# Patient Record
(demographics unavailable — no encounter records)

---

## 2024-12-03 NOTE — ADDENDUM
[FreeTextEntry1] : This note was written by Crystal Urias on 12/03/2024 acting as a scribe for AMANDA QUINTANA III, MD

## 2024-12-03 NOTE — PHYSICAL EXAM
[de-identified] : Right Hip:  Range of Motion in Degrees: 	                                             Claimant:	          Normal:	 Flexion (Active) 	                       120 	             120-degrees	 Flexion (Passive)	                       120	             120-degrees	 Extension (Active)	                        -30	             -30-degrees	 Extension (Passive)	                -30	             -30-degrees	 Abduction (Active)	                     45-50	          02-82-laglkpy	 Abduction (Passive)	             45-50	          95-41-jloyvhp	 Adduction (Active)                        20-30	          29-38-hxnsexj	 Adduction (Passive)	             20-30	          14-79-nhfmfyf	 Internal Rotation (Active) 	       35	          35-degrees	 Internal Rotation (Passive)	       35	          35-degrees	 External Rotation (Active)	       45	          45-degrees	 External Rotation (Passive)	       45	          45-degrees	  No tenderness with internal or external rotation or axial load.  Point tenderness over the greater trochanter with pain with resisted abduction.  Negative Trendelenburg.  No weakness to flexion, extension, abduction or adduction.  No evidence of instability.  No motor or sensory deficits.  2+ DP and PT pulses. Multiple well-healed scars.    [de-identified] : Ambulating with a slightly antalgic to antalgic gait.  Station:  Normal.  [de-identified] : Appearance:  Well-developed, well-nourished female in no acute distress.

## 2024-12-03 NOTE — HISTORY OF PRESENT ILLNESS
[de-identified] : The patient comes in today for her right hip. She hasn't been seen in awhile. She has some increasing complaints of pain in her right hip.

## 2024-12-03 NOTE — HISTORY OF PRESENT ILLNESS
[de-identified] : The patient comes in today for her right hip. She hasn't been seen in awhile. She has some increasing complaints of pain in her right hip.

## 2024-12-03 NOTE — DISCUSSION/SUMMARY
[de-identified] : At this time, due to trochanteric bursitis of the right hip, I recommended a cortisone injection (as noted above), ice and elevation. She will be reassessed in three to four weeks.

## 2024-12-03 NOTE — PHYSICAL EXAM
[de-identified] : Right Hip:  Range of Motion in Degrees: 	                                             Claimant:	          Normal:	 Flexion (Active) 	                       120 	             120-degrees	 Flexion (Passive)	                       120	             120-degrees	 Extension (Active)	                        -30	             -30-degrees	 Extension (Passive)	                -30	             -30-degrees	 Abduction (Active)	                     45-50	          45-07-kcwxhpa	 Abduction (Passive)	             45-50	          72-74-agdwinz	 Adduction (Active)                        20-30	          31-05-formttg	 Adduction (Passive)	             20-30	          34-91-knkkrka	 Internal Rotation (Active) 	       35	          35-degrees	 Internal Rotation (Passive)	       35	          35-degrees	 External Rotation (Active)	       45	          45-degrees	 External Rotation (Passive)	       45	          45-degrees	  No tenderness with internal or external rotation or axial load.  Point tenderness over the greater trochanter with pain with resisted abduction.  Negative Trendelenburg.  No weakness to flexion, extension, abduction or adduction.  No evidence of instability.  No motor or sensory deficits.  2+ DP and PT pulses. Multiple well-healed scars.    [de-identified] : Ambulating with a slightly antalgic to antalgic gait.  Station:  Normal.  [de-identified] : Appearance:  Well-developed, well-nourished female in no acute distress.

## 2024-12-03 NOTE — DISCUSSION/SUMMARY
[de-identified] : At this time, due to trochanteric bursitis of the right hip, I recommended a cortisone injection (as noted above), ice and elevation. She will be reassessed in three to four weeks.

## 2024-12-03 NOTE — PROCEDURE
[de-identified] : Indication: Trochanteric bursitis of the right hip   Consent: At this time, I have recommended an injection into the right hip. The risks and benefits of the procedure were discussed with the patient in detail.  Upon verbal consent of the patient, we proceeded with the injection as noted below.   Procedure: After a sterile prep, the patient underwent an injection of 9 mL of 1% Lidocaine (10mg/mL) without epinephrine and 1 mL of triamcinolone acetonide (40mg/mL) into the right hip. The patient tolerated the procedure well.  There were no complications.   :  [employer] Drug name:     Triamcinolone Acetonide Injectable Suspension USP NDC #:            [address]-6 Lot #:               [id number] Expiration:      08/31/2025

## 2024-12-03 NOTE — PROCEDURE
[de-identified] : Indication: Trochanteric bursitis of the right hip   Consent: At this time, I have recommended an injection into the right hip. The risks and benefits of the procedure were discussed with the patient in detail.  Upon verbal consent of the patient, we proceeded with the injection as noted below.   Procedure: After a sterile prep, the patient underwent an injection of 9 mL of 1% Lidocaine (10mg/mL) without epinephrine and 1 mL of triamcinolone acetonide (40mg/mL) into the right hip. The patient tolerated the procedure well.  There were no complications.   :  [employer] Drug name:     Triamcinolone Acetonide Injectable Suspension USP NDC #:            [address]-4 Lot #:               [id number] Expiration:      08/31/2025

## 2024-12-04 NOTE — DISCUSSION/SUMMARY
[de-identified] : At this time, due to osteoarthritis of the right knee, I recommend a cortisone injection (as noted above), ice and elevation. She will be reassessed in two to three weeks if necessary.

## 2024-12-04 NOTE — DISCUSSION/SUMMARY
[de-identified] : At this time, due to osteoarthritis of the right knee, I recommend a cortisone injection (as noted above), ice and elevation. She will be reassessed in two to three weeks if necessary.

## 2024-12-04 NOTE — ADDENDUM
[FreeTextEntry1] : This note was written by Crystal Urias on 12/04/2024 acting as a scribe for AMANDA QUINTANA III, MD

## 2024-12-04 NOTE — HISTORY OF PRESENT ILLNESS
[de-identified] : The patient comes in today for her right knee. She states her hip is feeling great, but she had a fall injuring her right knee.

## 2024-12-04 NOTE — HISTORY OF PRESENT ILLNESS
[de-identified] : The patient comes in today for her right knee. She states her hip is feeling great, but she had a fall injuring her right knee.

## 2024-12-04 NOTE — PHYSICAL EXAM
[de-identified] : Right Knee: Range of Motion in Degrees                                             Claimant:              Normal: Flexion Active:                        135                135-degrees Flexion Passive:                     135                135-degrees Extension Active:                    0-5                 0-5-degrees Extension Passive:                 0-5                 0-5-degrees    No weakness to flexion/extension. No evidence of instability in the AP plane or varus or valgus stress. Negative Lachman. Negative pivot shift.  Negative anterior drawer test.  Negative posterior drawer test. Negative Trevor. Negative Apley grind.  No medial or lateral joint line tenderness.  Positive tenderness over the medial and lateral facet of the patella.  Positive patellofemoral crepitations.  No lateral tilting patella.  No patella apprehension.  Positive crepitation in the medial and lateral femoral condyle.  No proximal or distal swelling, edema or tenderness.  No gross motor or sensory deficits.  Mild intra-articular swelling. 2+ DP and PT pulses. No varus or valgus malalignment. Well-healed scar.    Left Knee:  Range of Motion in Degrees	 	                              Claimant:	             Normal:	 Flexion Active	                 135 	        135-degrees	 Flexion Passive	         135	                135-degrees	 Extension Active	         0-5	                0-5-degrees	 Extension Passive	         0-5	                0-5-degrees	  No weakness to flexion/extension.  No evidence of instability in the AP plane or varus or valgus stress.  Negative Lachman.  Negative pivot shift.  Negative anterior drawer test.  Negative posterior drawer test.  Negative Trevor.  Negative Apley grind.  No medial or lateral joint line tenderness.  No tenderness over the medial and lateral facet of the patella.  No patellofemoral crepitations.  No lateral tilting patella.  No patellar apprehension.  No crepitation in the medial and lateral femoral condyle.  No proximal or distal swelling, edema or tenderness.  No gross motor or sensory deficits.  No intra-articular swelling.  2+ DP and PT pulses. No varus or valgus malalignment.  Skin is intact.  No rashes, scars or lesions.     [de-identified] : Ambulating with a slightly antalgic to antalgic gait.  Station:  Normal.  [de-identified] : Appearance:  Well-developed, well-nourished female in no acute distress.   [de-identified] : Radiographs, which were taken in the office today, two views of the right knee, show moderate to early severe degenerative changes, status post IM nailing.

## 2024-12-04 NOTE — PHYSICAL EXAM
[de-identified] : Right Knee: Range of Motion in Degrees                                             Claimant:              Normal: Flexion Active:                        135                135-degrees Flexion Passive:                     135                135-degrees Extension Active:                    0-5                 0-5-degrees Extension Passive:                 0-5                 0-5-degrees    No weakness to flexion/extension. No evidence of instability in the AP plane or varus or valgus stress. Negative Lachman. Negative pivot shift.  Negative anterior drawer test.  Negative posterior drawer test. Negative Trevor. Negative Apley grind.  No medial or lateral joint line tenderness.  Positive tenderness over the medial and lateral facet of the patella.  Positive patellofemoral crepitations.  No lateral tilting patella.  No patella apprehension.  Positive crepitation in the medial and lateral femoral condyle.  No proximal or distal swelling, edema or tenderness.  No gross motor or sensory deficits.  Mild intra-articular swelling. 2+ DP and PT pulses. No varus or valgus malalignment. Well-healed scar.    Left Knee:  Range of Motion in Degrees	 	                              Claimant:	             Normal:	 Flexion Active	                 135 	        135-degrees	 Flexion Passive	         135	                135-degrees	 Extension Active	         0-5	                0-5-degrees	 Extension Passive	         0-5	                0-5-degrees	  No weakness to flexion/extension.  No evidence of instability in the AP plane or varus or valgus stress.  Negative Lachman.  Negative pivot shift.  Negative anterior drawer test.  Negative posterior drawer test.  Negative Trevor.  Negative Apley grind.  No medial or lateral joint line tenderness.  No tenderness over the medial and lateral facet of the patella.  No patellofemoral crepitations.  No lateral tilting patella.  No patellar apprehension.  No crepitation in the medial and lateral femoral condyle.  No proximal or distal swelling, edema or tenderness.  No gross motor or sensory deficits.  No intra-articular swelling.  2+ DP and PT pulses. No varus or valgus malalignment.  Skin is intact.  No rashes, scars or lesions.     [de-identified] : Ambulating with a slightly antalgic to antalgic gait.  Station:  Normal.  [de-identified] : Appearance:  Well-developed, well-nourished female in no acute distress.   [de-identified] : Radiographs, which were taken in the office today, two views of the right knee, show moderate to early severe degenerative changes, status post IM nailing.

## 2024-12-04 NOTE — PROCEDURE
[de-identified] : Indication: Osteoarthritis of the right knee   Consent: At this time, I have recommended an injection into the right knee. The risks and benefits of the procedure were discussed with the patient in detail.  Upon verbal consent of the patient, we proceeded with the injection as noted below.   Procedure: After a sterile prep, the patient underwent an injection of 9 mL of 1% Lidocaine (10mg/mL) without epinephrine and 1 mL of triamcinolone acetonide (40mg/mL) into the right knee. The patient tolerated the procedure well.  There were no complications.   :  [employer] Drug name:     Triamcinolone Acetonide Injectable Suspension USP NDC #:            [address]-4 Lot #:               [id number] Expiration:      08/31/2025

## 2024-12-04 NOTE — PROCEDURE
[de-identified] : Indication: Osteoarthritis of the right knee   Consent: At this time, I have recommended an injection into the right knee. The risks and benefits of the procedure were discussed with the patient in detail.  Upon verbal consent of the patient, we proceeded with the injection as noted below.   Procedure: After a sterile prep, the patient underwent an injection of 9 mL of 1% Lidocaine (10mg/mL) without epinephrine and 1 mL of triamcinolone acetonide (40mg/mL) into the right knee. The patient tolerated the procedure well.  There were no complications.   :  [employer] Drug name:     Triamcinolone Acetonide Injectable Suspension USP NDC #:            [address]-2 Lot #:               [id number] Expiration:      08/31/2025

## 2024-12-11 NOTE — HISTORY OF PRESENT ILLNESS
[FreeTextEntry1] : 80 y/o female with PMH of MM s/p bone marrow transplant on pomalidomide/ on ppx acyclovir , HTN, and b/l DVTs s/p right femur fracture, PE s/p thrombectomy who presents for follow up. She was recently seen on 2/2/24 after experiencing LE edema and weight gain which improved after starting lasix. Since her last visit, her weight has remained stable at  lbs, but she does still report LE edema. Her SBP has been between 120 and 150. TTE recently normal. She denies any syncope, LH/dizziness, chest pain, palpitations, PND, orthopnea, nausea/vomiting, abdominal pain. Recently started olanzapine.   9/18/24 pt presents today- she has been complaining of all over edema - NP staff has advised an extra dose of Lasix over the last two weeks and to go the ER if in any type of distress. Pt states she has gained 8lbs in one month. [place] [facility] [facility] No DVT on US LE   12/11/24: Pt is doing well, + intermittent SOB on walking with walker, mild LE swelling but stable, body weight stable

## 2024-12-11 NOTE — PHYSICAL EXAM
[Well Nourished] : well nourished [No Acute Distress] : no acute distress [Frail] : frail [Normal Conjunctiva] : normal conjunctiva [Normal Venous Pressure] : normal venous pressure [No Carotid Bruit] : no carotid bruit [Normal S1, S2] : normal S1, S2 [No Murmur] : no murmur [No Rub] : no rub [No Gallop] : no gallop [Good Air Entry] : good air entry [No Respiratory Distress] : no respiratory distress  [Soft] : abdomen soft [Non Tender] : non-tender [No Masses/organomegaly] : no masses/organomegaly [Normal Bowel Sounds] : normal bowel sounds [Abnormal Gait] : abnormal gait [No Cyanosis] : no cyanosis [No Clubbing] : no clubbing [No Varicosities] : no varicosities [No Rash] : no rash [No Skin Lesions] : no skin lesions [Moves all extremities] : moves all extremities [No Focal Deficits] : no focal deficits [Normal Speech] : normal speech [Alert and Oriented] : alert and oriented [Normal memory] : normal memory [de-identified] : Right lower lung crackles [de-identified] : pt uses walker  [de-identified] : mild LE edmea B/L

## 2024-12-11 NOTE — CARDIOLOGY SUMMARY
[de-identified] : 8/2022: Summary:  1. Normal global left ventricular systolic function.  2. Left ventricular ejection fraction, by visual estimation, is 70 to  75%.  3. Spectral Doppler shows impaired relaxation pattern of left  ventricular myocardial filling (Grade I diastolic dysfunction).  4. Normal left atrial size.  5. Normal right atrial size.  6. Sclerotic aortic valve with normal opening.  7. Mild mitral annular calcification.  8. Mild mitral valve regurgitation.  CONCLUSIONS: Echo (2/6/24)  1. Mild left ventricular hypertrophy. 2. Left ventricular wall thickness is mildly increased. Left ventricular systolic function is normal with an ejection fraction visually estimated at 60 to 65 %. 3. There is mild (grade 1) left ventricular diastolic dysfunction. 4. Normal right ventricular cavity size and normal systolic function. 5. There is mild calcification of the mitral valve annulus with thickened mitral valve 6. Trace mitral regurgitation. 7. Mild tricuspid regurgitation. 8. Trace pericardial effusion.  ________________________________________________________________________________________    [de-identified] : [facility] PE:  Conclusions:  The right common femoral vein was accessed in standard fashion using modified seldinger technique and ultrasound guided  access. The angled pigtail was placed in the right and left pulmonary arteries and power injection arteriography was performed with subtracted imaging. A multi-purpose diagnostic catheter was paced in the more in the segmental arteries on both the right and left sides and selective arteriography was performed via hand injection.  A catheter was advanced to the bilateral pulmonary arteries. Contrast was injected. Images were obtained. Selective segmental pulmonary injection via multipurpose catheter. Saddle pulmonary embolism. Extensive thrombus bilaterally.    After careful wire purchase, the 24F Inari sheath was inserted. Thereafter the T24 Inari catheter was placed in the bilateral pulmonary arteries and retracted as per protocol. Separate insertion of the sheath in both pulmonary arteries was then performed followed by aspiration thrombectomy.   Right heart completed at the end of the procedure which demonstrated Pa pressure decreased from 54/13/30 to 39/15/22, O2 increased from 90 to 100, and HR reduced from 110 to 98. Figure of 8 suture used to obtain hemostasis with successul hemostasis obtained.    Patent to be monitored closely in the MICU. Figre of 8 suture to be removed in 8 hours with bedrest for 12 hours. Advance diet as tolerated and advised by GI given GI issues. Maintain therpautic heparin. Vascular cardiology to follow with plan for repeat   [de-identified] : 8/4/2022:\par  \par  FINDINGS:\par  \par  RIGHT:\par  Persistent extensive deep venous thrombus involving the right common \par  femoral, femoral vein popliteal and posterior tibial vein..\par  \par  LEFT:\par  Left lower extremity does not demonstrate thrombus involving the left \par  common femoral femoral vein or popliteal vein. Left posterior tibial vein \par  thrombus is seen.\par  \par  IMPRESSION:\par  Persistent extensive right lower extremity deep venous thrombus without \par  change.\par  \par  Previously seen nonocclusive left femoral vein deep venous thrombus not \par  identified on today's exam. Bloating the left peroneal vein was \par  documented to be thrombosed

## 2024-12-11 NOTE — REVIEW OF SYSTEMS
[Joint Pain] : joint pain [Negative] : Heme/Lymph [Weight Gain (___ Lbs)] : no recent weight gain [Cough] : no cough [Wheezing] : no wheezing [Coughing Up Blood] : no hemoptysis [Joint Swelling] : no joint swelling [FreeTextEntry5] : Intermittent SOB with exertion, mild LE edema

## 2024-12-11 NOTE — DISCUSSION/SUMMARY
[FreeTextEntry1] : 78 y/o female with PMH of MM s/p bone marrow transplant on pomalidomide/ on ppx acyclovir , HTN, and b/l DVTs s/p right femur fracture, with recent intermediate risk PE, DVT. No more SOB or leg swelling.. Normal TTE. In the setting of ongoing treatment from [facility]. Possible increase in BP from olanzipine.  -continue with apixaban 2.5 mg bid indefinitely, with Dr. Claros from [facility] spoken to, dose as per AMPLIFY EXT post treatment - continue irbesartan, well controlled   Leg edema: improving, now mild  - switch furosemide 20mg daily to every other day  - continue to elevate lower extremities at night and using compression shocks  - advised pt to increase protein   -F/U 3month, if no difference with lasix, plan to stop then   Differential diagnosis and plan of care discussed with patient after the evaluation.  Counseling on diet, exercise, and medication compliance was done. Counseling on smoking and alcohol cessation was offered when appropriate. Pain assessed and judicious use of narcotics when appropriate was discussed. Importance of fall prevention discussed. Advanced care planning was discussed with patient and family.  I, Dr. Welch personally performed the evaluation and management (E/M) services for this established patient who presents today with (a) new problem(s)/exacerbation of (an) existing condition(s).  That E/M includes conducting the clinically appropriate interval history &/or exam, assessing all new/exacerbated conditions, and establishing a new plan of care.  Today, my MANUEL, e[employer]Sa, was here to observe &/or participated in my evaluation and management service for this new problem/exacerbated condition and follow the plan of care established by me going forward.  EKG was utilized in making diagnosis and management.  [EKG obtained to assist in diagnosis and management of assessed problem(s)] : EKG obtained to assist in diagnosis and management of assessed problem(s)

## 2025-02-24 NOTE — PROCEDURE
[de-identified] : Indication: Trochanteric bursitis of the right hip   Consent: At this time, I have recommended an injection into the right hip. The risks and benefits of the procedure were discussed with the patient in detail.  Upon verbal consent of the patient, we proceeded with the injection as noted below.   Procedure: After a sterile prep, the patient underwent an injection of 9 mL of 1% Lidocaine (10mg/mL) without epinephrine and 1 mL of triamcinolone acetonide (40mg/mL) into the right hip. The patient tolerated the procedure well.  There were no complications.   :  [employer] Drug name:     Triamcinolone Acetonide Injectable Suspension USP NDC #:            [address]-8 Lot #:               [id number] Expiration:      08/31/2025

## 2025-02-24 NOTE — HISTORY OF PRESENT ILLNESS
[de-identified] : The patient comes in today with some persistent complaints of pain to her right hip.

## 2025-02-24 NOTE — DISCUSSION/SUMMARY
[de-identified] : At this time, due to trochanteric bursitis status post IM nailing of the right hip, I recommended a cortisone injection (as noted above), ice and elevation. She will be reassessed in one week for a possible injection.

## 2025-02-24 NOTE — ADDENDUM
[FreeTextEntry1] : This note was written by Crystal Urias on 02/24/2025 acting as a scribe for AMANDA QUINTANA III, MD

## 2025-02-24 NOTE — PHYSICAL EXAM
[de-identified] : Right Hip:  Range of Motion in Degrees: 	                                            Claimant:	       Normal:	 Flexion (Active) 	                       120 	          120-degrees	 Flexion (Passive)	                       120	          120-degrees	 Extension (Active)	                       -30	          -30-degrees	 Extension (Passive)	               -30	          -30-degrees	 Abduction (Active)	                    45-50	          19-32-zckgdut	 Abduction (Passive)	            45-50	          88-83-abwngjb	 Adduction (Active)                       20-30	          35-99-pkeqcth	 Adduction (Passive)	            20-30	          01-45-vygqena	 Internal Rotation (Active) 	      35	          35-degrees	 Internal Rotation (Passive)	      35	          35-degrees	 External Rotation (Active)	      45	          45-degrees	 External Rotation (Passive)	      45	          45-degrees	  No tenderness with internal or external rotation or axial load.  Point tenderness over the greater trochanter with pain with resisted abduction.  Negative Trendelenburg.  No weakness to flexion, extension, abduction or adduction.  No evidence of instability.  No motor or sensory deficits.  2+ DP and PT pulses.  Well-healed scars.   [de-identified] : Ambulating with a slightly antalgic to antalgic gait.  Station:  Normal.  [de-identified] : Appearance:  Well-developed, well-nourished female in no acute distress.   [de-identified] : Radiographs, which were taken in the office today, two-three views of the right hip, including the pelvis, show status post IM nailing.

## 2025-03-05 NOTE — CARDIOLOGY SUMMARY
[de-identified] : 8/2022: Summary:  1. Normal global left ventricular systolic function.  2. Left ventricular ejection fraction, by visual estimation, is 70 to  75%.  3. Spectral Doppler shows impaired relaxation pattern of left  ventricular myocardial filling (Grade I diastolic dysfunction).  4. Normal left atrial size.  5. Normal right atrial size.  6. Sclerotic aortic valve with normal opening.  7. Mild mitral annular calcification.  8. Mild mitral valve regurgitation.  CONCLUSIONS: Echo (2/6/24)  1. Mild left ventricular hypertrophy. 2. Left ventricular wall thickness is mildly increased. Left ventricular systolic function is normal with an ejection fraction visually estimated at 60 to 65 %. 3. There is mild (grade 1) left ventricular diastolic dysfunction. 4. Normal right ventricular cavity size and normal systolic function. 5. There is mild calcification of the mitral valve annulus with thickened mitral valve 6. Trace mitral regurgitation. 7. Mild tricuspid regurgitation. 8. Trace pericardial effusion.  ________________________________________________________________________________________    [de-identified] : [facility] PE:  Conclusions:  The right common femoral vein was accessed in standard fashion using modified seldinger technique and ultrasound guided  access. The angled pigtail was placed in the right and left pulmonary arteries and power injection arteriography was performed with subtracted imaging. A multi-purpose diagnostic catheter was paced in the more in the segmental arteries on both the right and left sides and selective arteriography was performed via hand injection.  A catheter was advanced to the bilateral pulmonary arteries. Contrast was injected. Images were obtained. Selective segmental pulmonary injection via multipurpose catheter. Saddle pulmonary embolism. Extensive thrombus bilaterally.    After careful wire purchase, the 24F Inari sheath was inserted. Thereafter the T24 Inari catheter was placed in the bilateral pulmonary arteries and retracted as per protocol. Separate insertion of the sheath in both pulmonary arteries was then performed followed by aspiration thrombectomy.   Right heart completed at the end of the procedure which demonstrated Pa pressure decreased from 54/13/30 to 39/15/22, O2 increased from 90 to 100, and HR reduced from 110 to 98. Figure of 8 suture used to obtain hemostasis with successul hemostasis obtained.    Patent to be monitored closely in the MICU. Figre of 8 suture to be removed in 8 hours with bedrest for 12 hours. Advance diet as tolerated and advised by GI given GI issues. Maintain therpautic heparin. Vascular cardiology to follow with plan for repeat   [de-identified] : 8/4/2022:\par  \par  FINDINGS:\par  \par  RIGHT:\par  Persistent extensive deep venous thrombus involving the right common \par  femoral, femoral vein popliteal and posterior tibial vein..\par  \par  LEFT:\par  Left lower extremity does not demonstrate thrombus involving the left \par  common femoral femoral vein or popliteal vein. Left posterior tibial vein \par  thrombus is seen.\par  \par  IMPRESSION:\par  Persistent extensive right lower extremity deep venous thrombus without \par  change.\par  \par  Previously seen nonocclusive left femoral vein deep venous thrombus not \par  identified on today's exam. Bloating the left peroneal vein was \par  documented to be thrombosed

## 2025-03-05 NOTE — DISCUSSION/SUMMARY
[FreeTextEntry1] : 78 y/o female with PMH of MM s/p bone marrow transplant on pomalidomide/ on ppx acyclovir , HTN, and b/l DVTs s/p right femur fracture, with recent intermediate risk PE, DVT. No more SOB or leg swelling.. Normal TTE. In the setting of ongoing treatment from [facility] [place]. Possible increase in BP from olanzipine.  -continue with apixaban 2.5 mg bid indefinitely, with Dr. Claros from [facility] [place] spoken to, dose as per AMPLIFY EXT post treatment - continue irbesartan, well controlled   Leg edema: improving, now mild  - tolerated furosemide 20mg daily to every other day, would stop as JVP 7-8 - continue to elevate lower extremities at night and using compression shocks  - advised pt to increase protein   -F/U 3month  Differential diagnosis and plan of care discussed with patient after the evaluation.  Counseling on diet, exercise, and medication compliance was done. Counseling on smoking and alcohol cessation was offered when appropriate. Pain assessed and judicious use of narcotics when appropriate was discussed. Importance of fall prevention discussed. Advanced care planning was discussed with patient and family.  I, Dr. Welch personally performed the evaluation and management (E/M) services for this established patient who presents today with (a) new problem(s)/exacerbation of (an) existing condition(s).  That E/M includes conducting the clinically appropriate interval history &/or exam, assessing all new/exacerbated conditions, and establishing a new plan of care.  Today, my MANUEL, [employer], was here to observe &/or participated in my evaluation and management service for this new problem/exacerbated condition and follow the plan of care established by me going forward.  EKG was utilized in making diagnosis and management.  [EKG obtained to assist in diagnosis and management of assessed problem(s)] : EKG obtained to assist in diagnosis and management of assessed problem(s)

## 2025-03-05 NOTE — HISTORY OF PRESENT ILLNESS
[FreeTextEntry1] : 80 y/o female with PMH of MM s/p bone marrow transplant on pomalidomide/ on ppx acyclovir , HTN, and b/l DVTs s/p right femur fracture, PE s/p thrombectomy who presents for follow up. She was recently seen on 2/2/24 after experiencing LE edema and weight gain which improved after starting lasix. Since her last visit, her weight has remained stable at  lbs, but she does still report LE edema. Her SBP has been between 120 and 150. TTE recently normal. She denies any syncope, LH/dizziness, chest pain, palpitations, PND, orthopnea, nausea/vomiting, abdominal pain. Recently started olanzapine.   9/18/24 pt presents today- she has been complaining of all over edema - NP staff has advised an extra dose of Lasix over the last two weeks and to go the ER if in any type of distress. Pt states she has gained 8lbs in one month. Pt is currently undergoing a clinical trial at [facility] [place] for MM. Albumin 3.4 yesterday at [facility] [place]. No DVT on US LE   12/11/24: Pt is doing well, + intermittent SOB on walking with walker, mild LE swelling but stable, body weight stable   2/2025: walking 10 minutes, 1-2 a day, no sob, just some fatigue

## 2025-03-05 NOTE — REVIEW OF SYSTEMS
[Weight Gain (___ Lbs)] : no recent weight gain [Cough] : no cough [Wheezing] : no wheezing [Coughing Up Blood] : no hemoptysis [Joint Pain] : joint pain [Joint Swelling] : no joint swelling [Negative] : Heme/Lymph [FreeTextEntry5] : Intermittent SOB with exertion, mild LE edema

## 2025-03-05 NOTE — PHYSICAL EXAM
[Well Nourished] : well nourished [No Acute Distress] : no acute distress [Frail] : frail [Normal Conjunctiva] : normal conjunctiva [Normal Venous Pressure] : normal venous pressure [No Carotid Bruit] : no carotid bruit [Normal S1, S2] : normal S1, S2 [No Murmur] : no murmur [No Rub] : no rub [No Gallop] : no gallop [Good Air Entry] : good air entry [No Respiratory Distress] : no respiratory distress  [Soft] : abdomen soft [Non Tender] : non-tender [No Masses/organomegaly] : no masses/organomegaly [Normal Bowel Sounds] : normal bowel sounds [Abnormal Gait] : abnormal gait [No Cyanosis] : no cyanosis [No Clubbing] : no clubbing [No Varicosities] : no varicosities [No Rash] : no rash [No Skin Lesions] : no skin lesions [Moves all extremities] : moves all extremities [No Focal Deficits] : no focal deficits [Normal Speech] : normal speech [Alert and Oriented] : alert and oriented [Normal memory] : normal memory [de-identified] : Right lower lung crackles [de-identified] : pt uses walker  [de-identified] : mild LE edmea B/L

## 2025-04-21 NOTE — DISCUSSION/SUMMARY
[de-identified] : The patient presents with trochanteric bursitis of the right hip.  At this time, she was given a cortisone injection.  I recommend ice, elevation and reassessment in 3-4 weeks.

## 2025-04-21 NOTE — DISCUSSION/SUMMARY
[de-identified] : The patient presents with trochanteric bursitis of the right hip.  At this time, she was given a cortisone injection.  I recommend ice, elevation and reassessment in 3-4 weeks.

## 2025-04-21 NOTE — PROCEDURE
[de-identified] : Indication: Trochanteric bursitis right hip   Consent: At this time, I have recommended an injection to the right hip.  The risks and benefits of the procedure were discussed with the patient in detail.  Upon verbal consent of the patient, we proceeded with the injection as noted below.   Description of Procedure: After a sterile prep, the patient underwent an injection of approximately 9 mL of 1% Lidocaine (10 mg/mL) without epinephrine and 1 mL of triamcinolone acetonide (40 mg/mL) into the right hip.  The patient tolerated the procedure well.  There were no complications.   :  [employer] Drug Name:  Triamcinolone Acetonide Injectable Suspension USP NCD#:  [address]-8 Lot#:  [id number] Expiration Date:  08/31/2025

## 2025-04-21 NOTE — PROCEDURE
[de-identified] : Indication: Trochanteric bursitis right hip   Consent: At this time, I have recommended an injection to the right hip.  The risks and benefits of the procedure were discussed with the patient in detail.  Upon verbal consent of the patient, we proceeded with the injection as noted below.   Description of Procedure: After a sterile prep, the patient underwent an injection of approximately 9 mL of 1% Lidocaine (10 mg/mL) without epinephrine and 1 mL of triamcinolone acetonide (40 mg/mL) into the right hip.  The patient tolerated the procedure well.  There were no complications.   :  [employer] Drug Name:  Triamcinolone Acetonide Injectable Suspension USP NCD#:  [address]-6 Lot#:  [id number] Expiration Date:  08/31/2025

## 2025-04-21 NOTE — ADDENDUM
[FreeTextEntry1] : This note was written by Tabatha Fraire on 04/18/2025 acting as scribe for Ricky Escobedo III, MD

## 2025-04-21 NOTE — HISTORY OF PRESENT ILLNESS
[de-identified] : The patient comes in today with increasing complaints of pain to her right hip.  She did very well from her injection last time.

## 2025-04-21 NOTE — PHYSICAL EXAM
[de-identified] : Right Hip: Hip: Range of Motion in Degrees: 	                                 Claimant:	          Normal:	 Flexion (Active) 	                 120 	          120-degrees	 Flexion (Passive)	                 120	          120-degrees	 Extension (Active)	                 -30	          -30-degrees	 Extension (Passive)	 -30	          -30-degrees	 Abduction (Active)	                45-50	          39-69-idhpint	 Abduction (Passive)	45-50	          86-39-tmsqvae	 Adduction (Active)                	20-30	          44-44-uwwfyzy	 Adduction (Passive)	20-30	          21-52-meiackc	 Internal Rotation (Active) 	35	          35-degrees	 Internal Rotation (Passive)	35	          35-degrees	 External Rotation (Active)	45	          45-degrees	 External Rotation (Passive)	45	          45-degrees	  No tenderness with internal or external rotation or axial load.  Point tenderness over the greater trochanter with pain with resisted abduction.  Negative Trendelenburg.  No weakness to flexion, extension, abduction or adduction.  No evidence of instability.  No motor or sensory deficits.  2+ DP and PT pulses.  Well-healed scar.   [de-identified] : Gait and Station:  Ambulating with a slightly antalgic to antalgic gait.  Station:  Normal.  [de-identified] : Appearance:  Well-developed, well-nourished female in no acute distress.   [de-identified] : Radiographs, two to three views of the right hip with pelvis taken in the office today, show mild osteoarthritis and status post ORIF of hip.

## 2025-04-21 NOTE — PHYSICAL EXAM
[de-identified] : Right Hip: Hip: Range of Motion in Degrees: 	                                 Claimant:	          Normal:	 Flexion (Active) 	                 120 	          120-degrees	 Flexion (Passive)	                 120	          120-degrees	 Extension (Active)	                 -30	          -30-degrees	 Extension (Passive)	 -30	          -30-degrees	 Abduction (Active)	                45-50	          15-02-dbbrree	 Abduction (Passive)	45-50	          57-09-plyhtzl	 Adduction (Active)                	20-30	          65-39-snkhtwv	 Adduction (Passive)	20-30	          93-89-kvaleqt	 Internal Rotation (Active) 	35	          35-degrees	 Internal Rotation (Passive)	35	          35-degrees	 External Rotation (Active)	45	          45-degrees	 External Rotation (Passive)	45	          45-degrees	  No tenderness with internal or external rotation or axial load.  Point tenderness over the greater trochanter with pain with resisted abduction.  Negative Trendelenburg.  No weakness to flexion, extension, abduction or adduction.  No evidence of instability.  No motor or sensory deficits.  2+ DP and PT pulses.  Well-healed scar.   [de-identified] : Gait and Station:  Ambulating with a slightly antalgic to antalgic gait.  Station:  Normal.  [de-identified] : Appearance:  Well-developed, well-nourished female in no acute distress.   [de-identified] : Radiographs, two to three views of the right hip with pelvis taken in the office today, show mild osteoarthritis and status post ORIF of hip.

## 2025-04-21 NOTE — HISTORY OF PRESENT ILLNESS
[de-identified] : The patient comes in today with increasing complaints of pain to her right hip.  She did very well from her injection last time.

## 2025-05-06 NOTE — REVIEW OF SYSTEMS
[FreeTextEntry2] : Right C6-C7 SERGEY [As Noted in HPI] : as noted in HPI [Diarrhea] : diarrhea [Negative] : Heme/Lymph

## 2025-05-10 NOTE — HISTORY OF PRESENT ILLNESS
[FreeTextEntry1] : GLORIA LOMBARDI is an 82-year-old F with a GI PMHx significant for gastroparesis, chronic constipation presenting to the office today for evaluation of diarrhea.  Pt reports ~ 1 mo course of alternating diarrhea with normal stools. She endorses typical AM BM requiring some straining followed by liquid/semi formed diarrhea with fecal incontinence. Of note, was on a fentanyl patch for management of multiple myeloma until about 1 mo ago when this began. She has previously trialed Movantik for constipation in the past but stopped because diarrhea began. There is no blood, abdominal pain, nor unintentional weight loss. No known sick contacts nor recent abx use.

## 2025-05-10 NOTE — ASSESSMENT
[FreeTextEntry1] : 82-year-old F presenting for eval of diarrhea.   Plan: # Diarrhea # Constipation: Patient with PMHx gastroparesis/constipation so based on pattern of diarrhea suspect overflow diarrhea/overflow fecal incontinence. Will acquire KUB to determine stool burden and determine bowel regimen thereafter. Orders placed for stool studies including GI PCR, fecal calpro and cdiff given length of symptoms and bowel irregularity.   Pt seen and discussed with MD Adair.   I, Dr. Adair, personally performed the evaluation and management (E/M) services for this established patient who presents today with (a) new problem(s)/exacerbation of (an) existing condition(s). That E/M includes conducting the examination, assessing all new/exacerbated conditions, and establishing a new plan of care. Today, my NPP, Ginger Harris, was here to observe my evaluation and management services for this new problem/exacerbated condition to be followed going forward.

## 2025-05-10 NOTE — PHYSICAL EXAM
[Alert] : alert [Normal Voice/Communication] : normal voice/communication [Healthy Appearing] : healthy appearing [No Acute Distress] : no acute distress [Sclera] : the sclera and conjunctiva were normal [Hearing Threshold Finger Rub Not [place]] : hearing was normal [Normal Lips/Gums] : the lips and gums were normal [Normal Appearance] : the appearance of the neck was normal [No Neck Mass] : no neck mass was observed [No Respiratory Distress] : no respiratory distress [No Acc Muscle Use] : no accessory muscle use [Respiration, Rhythm And Depth] : normal respiratory rhythm and effort [Heart Rate And Rhythm] : heart rate was normal and rhythm regular [Bowel Sounds] : normal bowel sounds [Abdomen Tenderness] : non-tender [No Masses] : no abdominal mass palpated [Abdomen Soft] : soft [] : no hepatosplenomegaly [Oriented To Time, Place, And Person] : oriented to person, place, and time [de-identified] : ambulates with walker

## 2025-05-10 NOTE — PHYSICAL EXAM
[Alert] : alert [Normal Voice/Communication] : normal voice/communication [Healthy Appearing] : healthy appearing [No Acute Distress] : no acute distress [Sclera] : the sclera and conjunctiva were normal [Hearing Threshold Finger Rub Not [place]] : hearing was normal [Normal Lips/Gums] : the lips and gums were normal [Normal Appearance] : the appearance of the neck was normal [No Neck Mass] : no neck mass was observed [No Respiratory Distress] : no respiratory distress [No Acc Muscle Use] : no accessory muscle use [Respiration, Rhythm And Depth] : normal respiratory rhythm and effort [Heart Rate And Rhythm] : heart rate was normal and rhythm regular [Bowel Sounds] : normal bowel sounds [Abdomen Tenderness] : non-tender [No Masses] : no abdominal mass palpated [Abdomen Soft] : soft [] : no hepatosplenomegaly [Oriented To Time, Place, And Person] : oriented to person, place, and time [de-identified] : ambulates with walker

## 2025-06-04 NOTE — DISCUSSION/SUMMARY
[FreeTextEntry1] : 80 y/o female with PMH of MM s/p bone marrow transplant on pomalidomide/ on ppx acyclovir , HTN, and b/l DVTs s/p right femur fracture, with recent intermediate risk PE, DVT. No more SOB or leg swelling.. Normal TTE. In the setting of ongoing treatment from [facility] [place]. Possible increase in BP from olanzipine.  -continue with apixaban 2.5 mg bid indefinitely, with Dr. Claros from [facility] spoken to, dose as per AMPLIFY EXT post treatment - continue irbesartan, well controlled   Leg edema: improving, now mild  - furosemide discontinued at last visit, currently still euvolemic. 3rd spacing in legs.  - continue to elevate lower extremities at night and using compression shocks  - advised pt to increase protein  -Keep weight log   -F/U 6month  Differential diagnosis and plan of care discussed with patient after the evaluation.  Counseling on diet, exercise, and medication compliance was done. Counseling on smoking and alcohol cessation was offered when appropriate. Pain assessed and judicious use of narcotics when appropriate was discussed. Importance of fall prevention discussed. Advanced care planning was discussed with patient and family.  I, Dr. Welch personally performed the evaluation and management (E/M) services for this established patient who presents today with (a) new problem(s)/exacerbation of (an) existing condition(s).  That E/M includes conducting the clinically appropriate interval history &/or exam, assessing all new/exacerbated conditions, and establishing a new plan of care.  Today, my MANUEL Mari Lee, was here to observe &/or participated in my evaluation and management service for this new problem/exacerbated condition and follow the plan of care established by me going forward.  EKG was utilized in making diagnosis and management.  [EKG obtained to assist in diagnosis and management of assessed problem(s)] : EKG obtained to assist in diagnosis and management of assessed problem(s)

## 2025-06-04 NOTE — HISTORY OF PRESENT ILLNESS
[FreeTextEntry1] : 82 y/o female with PMH of MM s/p bone marrow transplant on pomalidomide/ on ppx acyclovir , HTN, and b/l DVTs s/p right femur fracture, PE s/p thrombectomy who presents for follow up. She was recently seen on 2/2/24 after experiencing LE edema and weight gain which improved after starting lasix. Since her last visit, her weight has remained stable at  lbs, but she does still report LE edema. Her SBP has been between 120 and 150. TTE recently normal. She denies any syncope, LH/dizziness, chest pain, palpitations, PND, orthopnea, nausea/vomiting, abdominal pain. Recently started olanzapine.   9/18/24 pt presents today- she has been complaining of all over edema - NP staff has advised an extra dose of Lasix over the last two weeks and to go the ER if in any type of distress. Pt states she has gained 8lbs in one month. Pt is currently undergoing a clinical trial at [facility] [place] for MM. Albumin 3.4 yesterday at [facility] [place]. No DVT on US LE   12/11/24: Pt is doing well, + intermittent SOB on walking with walker, mild LE swelling but stable, body weight stable   2/2025: walking 10 minutes, 1-2 a day, no sob, just some fatigue 6/4/25:  pt continues to walk 10 laps around the house with some fatigue. Pt recently treated for cdiff which has now resolved. Furosemide was discontinued, pt has gained five pounds since last visit, denies SOB, Denies increased L/E edema.  Pt has not increased her caloric intake, nor has she increased her protein intake

## 2025-06-04 NOTE — CARDIOLOGY SUMMARY
[de-identified] : 8/2022: Summary:  1. Normal global left ventricular systolic function.  2. Left ventricular ejection fraction, by visual estimation, is 70 to  75%.  3. Spectral Doppler shows impaired relaxation pattern of left  ventricular myocardial filling (Grade I diastolic dysfunction).  4. Normal left atrial size.  5. Normal right atrial size.  6. Sclerotic aortic valve with normal opening.  7. Mild mitral annular calcification.  8. Mild mitral valve regurgitation.  CONCLUSIONS: Echo (2/6/24)  1. Mild left ventricular hypertrophy. 2. Left ventricular wall thickness is mildly increased. Left ventricular systolic function is normal with an ejection fraction visually estimated at 60 to 65 %. 3. There is mild (grade 1) left ventricular diastolic dysfunction. 4. Normal right ventricular cavity size and normal systolic function. 5. There is mild calcification of the mitral valve annulus with thickened mitral valve 6. Trace mitral regurgitation. 7. Mild tricuspid regurgitation. 8. Trace pericardial effusion.  ________________________________________________________________________________________    [de-identified] : [facility] PE:  Conclusions:  The right common femoral vein was accessed in standard fashion using modified seldinger technique and ultrasound guided  access. The angled pigtail was placed in the right and left pulmonary arteries and power injection arteriography was performed with subtracted imaging. A multi-purpose diagnostic catheter was paced in the more in the segmental arteries on both the right and left sides and selective arteriography was performed via hand injection.  A catheter was advanced to the bilateral pulmonary arteries. Contrast was injected. Images were obtained. Selective segmental pulmonary injection via multipurpose catheter. Saddle pulmonary embolism. Extensive thrombus bilaterally.    After careful wire purchase, the 24F Inari sheath was inserted. Thereafter the T24 Inari catheter was placed in the bilateral pulmonary arteries and retracted as per protocol. Separate insertion of the sheath in both pulmonary arteries was then performed followed by aspiration thrombectomy.   Right heart completed at the end of the procedure which demonstrated Pa pressure decreased from 54/13/30 to 39/15/22, O2 increased from 90 to 100, and HR reduced from 110 to 98. Figure of 8 suture used to obtain hemostasis with successul hemostasis obtained.    Patent to be monitored closely in the MICU. Figre of 8 suture to be removed in 8 hours with bedrest for 12 hours. Advance diet as tolerated and advised by GI given GI issues. Maintain therpautic heparin. Vascular cardiology to follow with plan for repeat   [de-identified] : 8/4/2022:\par  \par  FINDINGS:\par  \par  RIGHT:\par  Persistent extensive deep venous thrombus involving the right common \par  femoral, femoral vein popliteal and posterior tibial vein..\par  \par  LEFT:\par  Left lower extremity does not demonstrate thrombus involving the left \par  common femoral femoral vein or popliteal vein. Left posterior tibial vein \par  thrombus is seen.\par  \par  IMPRESSION:\par  Persistent extensive right lower extremity deep venous thrombus without \par  change.\par  \par  Previously seen nonocclusive left femoral vein deep venous thrombus not \par  identified on today's exam. Bloating the left peroneal vein was \par  documented to be thrombosed

## 2025-06-04 NOTE — PHYSICAL EXAM
[Well Nourished] : well nourished [No Acute Distress] : no acute distress [Frail] : frail [Normal Conjunctiva] : normal conjunctiva [Normal Venous Pressure] : normal venous pressure [No Carotid Bruit] : no carotid bruit [Normal S1, S2] : normal S1, S2 [No Murmur] : no murmur [No Rub] : no rub [No Gallop] : no gallop [Good Air Entry] : good air entry [No Respiratory Distress] : no respiratory distress  [Soft] : abdomen soft [Non Tender] : non-tender [No Masses/organomegaly] : no masses/organomegaly [Normal Bowel Sounds] : normal bowel sounds [Abnormal Gait] : abnormal gait [No Cyanosis] : no cyanosis [No Clubbing] : no clubbing [No Varicosities] : no varicosities [No Rash] : no rash [No Skin Lesions] : no skin lesions [Moves all extremities] : moves all extremities [No Focal Deficits] : no focal deficits [Normal Speech] : normal speech [Alert and Oriented] : alert and oriented [Normal memory] : normal memory [de-identified] : Right lower lung crackles [de-identified] : pt uses walker  [de-identified] : mild LE edmea B/L

## 2025-06-04 NOTE — REVIEW OF SYSTEMS
[Joint Pain] : joint pain [Negative] : Constitutional [Weight Gain (___ Lbs)] : no recent weight gain [Cough] : no cough [Wheezing] : no wheezing [Coughing Up Blood] : no hemoptysis [Joint Swelling] : no joint swelling [FreeTextEntry5] : mild LE edema

## 2025-06-19 NOTE — PHYSICAL EXAM
[de-identified] : Right hip: Hip: Range of Motion in Degrees: 	                                 Claimant:	          Normal:	 Flexion (Active) 	                 120 	          120-degrees	 Flexion (Passive)	                 120	          120-degrees	 Extension (Active)	                 -30	          -30-degrees	 Extension (Passive)	 -30	          -30-degrees	 Abduction (Active)	                45-50	          24-14-hsenruf	 Abduction (Passive)	45-50	          85-80-fxpreof	 Adduction (Active)                	20-30	          70-00-rovenwr	 Adduction (Passive)	20-30	          32-76-uouymtf	 Internal Rotation (Active) 	35	          35-degrees	 Internal Rotation (Passive)	35	          35-degrees	 External Rotation (Active)	45	          45-degrees	 External Rotation (Passive)	45	          45-degrees	  Tender over the distal screws.  Point tenderness over the greater trochanter with pain with resisted abduction.  Negative Trendelenburg.  No weakness to flexion, extension, abduction or adduction.  No evidence of instability.  No motor or sensory deficits.  2+ DP and PT pulses.    [de-identified] : Gait: Slightly antalgic to antalgic.  Station: Normal  [de-identified] : Appearance: Well-developed, well-nourished female  in no acute distress.

## 2025-06-19 NOTE — PROCEDURE
[de-identified] : Consent: At this time, I have recommended an injection to the right hip.  The risks and benefits of the procedure were discussed with the patient in detail.  Upon verbal consent of the patient, we proceeded with the injection as noted below.     Indications:  Trochanteric bursitis, right hip : [employer] Drug name: Triamcinolone Acetonide Injectable Suspension USP NDC#: [address]-5 Lot#: [id number] Expiration date: 08/31/25    Procedure:  After a sterile prep, the patient underwent an injection of 9 cc of 1% Lidocaine (10mg/mL) without epinephrine and 1 cc of triamcinolone acetonide (40 mg/mL) into the right hip.  The patient tolerated the procedure well.  There were no complications.

## 2025-06-19 NOTE — HISTORY OF PRESENT ILLNESS
[de-identified] : Leah comes in today for her right hip.  She is having increasing complaints of pain.

## 2025-06-19 NOTE — DISCUSSION/SUMMARY
[de-identified] : The patient presents with trochanteric bursitis with some painful hardware, right hip.  The patient was given an injection as noted above.  I recommend ice and elevation.  She will be reassessed in four weeks.  I advised her she may require hardware removal.